# Patient Record
Sex: MALE | Race: BLACK OR AFRICAN AMERICAN
[De-identification: names, ages, dates, MRNs, and addresses within clinical notes are randomized per-mention and may not be internally consistent; named-entity substitution may affect disease eponyms.]

---

## 2020-11-04 ENCOUNTER — HOSPITAL ENCOUNTER (EMERGENCY)
Dept: HOSPITAL 74 - FER | Age: 35
Discharge: HOME | End: 2020-11-04
Payer: COMMERCIAL

## 2020-11-04 VITALS — TEMPERATURE: 98.9 F | HEART RATE: 66 BPM | DIASTOLIC BLOOD PRESSURE: 75 MMHG | SYSTOLIC BLOOD PRESSURE: 132 MMHG

## 2020-11-04 VITALS — BODY MASS INDEX: 26 KG/M2

## 2020-11-04 DIAGNOSIS — L50.0: Primary | ICD-10-CM

## 2020-11-04 PROCEDURE — 3E033NZ INTRODUCTION OF ANALGESICS, HYPNOTICS, SEDATIVES INTO PERIPHERAL VEIN, PERCUTANEOUS APPROACH: ICD-10-PCS

## 2020-11-04 PROCEDURE — 3E023NZ INTRODUCTION OF ANALGESICS, HYPNOTICS, SEDATIVES INTO MUSCLE, PERCUTANEOUS APPROACH: ICD-10-PCS

## 2020-11-04 NOTE — PDOC
History of Present Illness





- General


Chief Complaint: Allergic Reaction


Stated Complaint: BEE STING


Time Seen by Provider: 11/04/20 12:41





- History of Present Illness


Initial Comments: 





11/04/20 14:37


Chief complaint: Generalized redness and itching





HPI: Patient works outside, was stung by bee, immediately noticed itching of his

entire body, accompanied by hives.





Review of systems: No chest tightness or wheezing, no abdominal pain, nausea, 

vomiting, or diarrhea, no irritation or swelling of the back of the throat, 

tongue, lips, palate, or face.





Past medical history: Prior bee stings without reaction.  Otherwise healthy m

umesh, no active medical or surgical problems





Social/family history reviewed and noncontributory





Physical exam: Alert and oriented well-developed well-nourished mild distress 

due to itching, but cheerful and cooperative


Afebrile, vital signs normal


Generalized erythema of the trunk and extremities with diffuse urticaria.


Throat is clear.  There is no edema erythema swelling or mass.  There is no s

tridor or other respiratory distress


Lungs are clear, breath sounds are full, no wheezes rales or rhonchi


Abdomen is soft nontender without mass organomegaly.





Impression: Allergic urticaria.  No signs of angioedema





Plan: Benadryl, observation.





Past History





- Medical History


Allergies/Adverse Reactions: 


                                    Allergies











Allergy/AdvReac Type Severity Reaction Status Date / Time


 


bee pollen Allergy   Verified 11/04/20 12:37











Home Medications: 


Ambulatory Orders





Diphenhydramine [Benadryl -] 50 mg PO TID PRN #15 capsule 11/04/20 








COPD: No


CHF: No


DVT: No





- Psycho-Social/Smoking History


Smoking History: Never smoked





- Substance Abuse Hx (Audit-C & DAST Scrn)


How often the patient has a drink containing alcohol: Never


Score: In Men: 4 or > Positive; In Women: 3 or > Positive: 0


Screen Result (Pos requires Nsg. Audit-10AR): Negative


In the last yr the pt used illegal drug/Rx for NonMed reason: No


Score:  Yes response is considered Positive: 0


Screen Result (Positive result requires Nsg. DAST-10): Negative





*Physical Exam





- Vital Signs


                                Last Vital Signs











Temp Pulse Resp BP Pulse Ox


 


 98.9 F   66   16   132/75   100 


 


 11/04/20 12:37  11/04/20 12:37  11/04/20 12:37  11/04/20 12:37  11/04/20 12:37














ED Treatment Course





- Medications


Given in the ED: 


ED Medications














Discontinued Medications














Generic Name Dose Route Start Last Admin





  Trade Name Susan  PRN Reason Stop Dose Admin


 


Diphenhydramine HCl  25 mg  11/04/20 12:41  11/04/20 12:50





  Benadryl Injection -  IM  11/04/20 12:42  Not Given





  ONCE ONE  


 


Diphenhydramine HCl  25 mg  11/04/20 12:45  11/04/20 12:50





  Benadryl Injection -  IVPUSH  11/04/20 12:46  25 mg





  ONCE ONE   Administration














Medical Decision Making





- Medical Decision Making





11/04/20 14:40


Patient improved with observation and administration of Benadryl.  Erythema 

resolved.  Urticaria much improved.  Throat remains clear and there has been no 

swelling of the oropharynx or face.  No abdominal pain, nausea, vomiting, or 

diarrhea





Advised to carry Benadryl when working outside, to use it immediately and then 

proceed to the ER if further reaction.





Discharge





- Discharge Information


Problems reviewed: Yes


Clinical Impression/Diagnosis: 


 Allergic urticaria





Condition: Improved


Disposition: HOME





- Admission


No





- Additional Discharge Information


Prescriptions: 


Diphenhydramine [Benadryl -] 50 mg PO TID PRN #15 capsule


 PRN Reason: Reaction, itching, hives.





- Follow up/Referral





- Patient Discharge Instructions


Patient Printed Discharge Instructions:  DI for Hives


Additional Instructions: 


Cool compresses or lukewarm baths/showers.  Avoid hot water.  Benadryl as 

directed every 6 hours until symptoms are completely resolved.  If any 

additional symptoms develop, such as irritation or swelling of the throat tongue

or lips, or other parts of the face, chest tightness, wheezing, abdominal pain, 

nausea, vomiting, diarrhea, return to the ER immediately.  Otherwise follow-up 

with your primary physician.





It is recommended you carry Benadryl with you when working outside and take it 

immediately while you are making a way to the hospital if you are stung again.





- Post Discharge Activity


Work/Back to School Note:  Back to Work

## 2020-11-04 NOTE — XMS
Summarization Of Episode

                           Created on:2020



Patient:CARA STEPHENS

Sex:Male

:1985

External Reference #:[id number]





Demographics







                          Address                   [address]



                                                    [place] [address]

 

                          Home Phone                [phone]

 

                          Work Phone                [phone]

 

                          Preferred Language        English

 

                          Marital Status            Unknown

 

                          [employer] Affiliation     [place]

 

                          Race                      [place]

 

                          Ethnic Group              Unknown









Author







                          Organization              [facility]









Support







                Name            Relationship    Address         Phone

 

                TRELL             Unavailable     Unavailable     Unavailable









Care Team Providers







                    Name                Role                Phone

 

                    KIARRA ALCARAZ          Unavailable         Unavailable

 

                    FLORIN RAY      Unavailable         Unavailable









Re-disclosure Warning

The records that you are about to access may contain information from federally-
assisted alcohol or drug abuse programs. If such information is present, then 
the following federally mandated warning applies: This information has been 
disclosed to you from records protected by federal confidentiality rules (42 CFR
part 2). The federal rules prohibit you from making any further disclosure of 
this information unless further disclosure is expressly permitted by the written
consent of the person to whom it pertains or as otherwise permitted by 42 CFR 
part 2. A general authorization for the release of medical or other information 
is NOT sufficient for this purpose. The Federal rules restrict any use of the 
information to criminally investigate or prosecute any alcohol or drug abuse 
patient.The records that you are about to access may contain highly sensitive 
health information, the redisclosure of which is protected by Article 27-F of 
the [facility] law. If you continue you may haveaccess to 
information: Regarding HIV / AIDS; Provided by facilities licensed or operated 
by the [facility]; or Provided by the [facility]
Office for People With Developmental Disabilities. If such information is 
present, then the following [place] mandated warning applies: This 
information has been disclosed to you from confidential records which are 
protected by state law. State law prohibits you from making any further 
disclosure of this information without the specific written consent of the 
person to whom it pertains, or as otherwise permitted by law. Any unauthorized 
further disclosure in violation of state law may result in a fine or retirement 
sentence or both. A general authorization for the release of medical or other 
information is NOT sufficient authorization for further disclosure.



Encounters







           Encounter  Providers  Location   Date       Indications Data Source(s

)

 

           Outpatient Attender: FLOR,            10/08/2019 R35.1 Z00.00 Z13.1 

[facility]



                      HUMAYUNAdmitter:            02:33:00 PM L30.9      Health 

Care



                      [facility],                [employer]                   [employer]



                      HUMAYUNReferrer:                                  



                      LISSA KIARRA                                  









                                        R35.1 Z00.00 Z13.1 L30.9







Insurance Providers







          Payer name Policy type Policy ID Covered   Covered party's Policy    P

radha



                    / Coverage           party ID  relationship to Ann    Inf

ormation



                    type                          ann              

 

          SELF PAY                                SP                  



          INSURANCE                                                   







Problems, Conditions, and Diagnoses







           Code       Display Name Description Problem Type Effective Dates Data

 Source(s)

 

           R35.1      Nocturia   NOCTURIA   Diagnosis  10/08/2019 [place]



                                                       02:33:00 PM T [facility] [address]

on

 

           L30.9      Dermatitis, DERMATITIS, Diagnosis  10/08/2019 [place]



                      unspecified UNSPECIFIED            02:33:00 PM [facility] [address]

on

 

           Z13.1      Encounter for ENCOUNTER FOR Diagnosis  10/08/2019 [facility]



                      screening for SCREENING FOR            02:33:00 PM EDT [facility]

[employer]



                      diabetes mellitus DIABETES MELLITUS                       

[facility]

 

           Z00.00     Encounter for ENCNTR FOR Diagnosis  10/08/2019 [place]



                      general adult GENERAL ADULT            02:33:00 PM EDT [facility]

[employer]



                      medical    MEDICAL EXAM W/O                       [facility]

poration



                      examination ABNORMAL FINDINGS                       



                      without abnormal                                  



                      findings

## 2023-02-01 ENCOUNTER — HOSPITAL ENCOUNTER (EMERGENCY)
Dept: HOSPITAL 74 - FER | Age: 38
Discharge: HOME | End: 2023-02-01
Payer: COMMERCIAL

## 2023-02-01 VITALS
HEART RATE: 61 BPM | TEMPERATURE: 98.3 F | DIASTOLIC BLOOD PRESSURE: 74 MMHG | RESPIRATION RATE: 18 BRPM | SYSTOLIC BLOOD PRESSURE: 134 MMHG

## 2023-02-01 VITALS — BODY MASS INDEX: 23.7 KG/M2

## 2023-02-01 DIAGNOSIS — M54.50: Primary | ICD-10-CM

## 2023-02-01 PROCEDURE — 3E0233Z INTRODUCTION OF ANTI-INFLAMMATORY INTO MUSCLE, PERCUTANEOUS APPROACH: ICD-10-PCS
